# Patient Record
Sex: MALE | Race: WHITE
[De-identification: names, ages, dates, MRNs, and addresses within clinical notes are randomized per-mention and may not be internally consistent; named-entity substitution may affect disease eponyms.]

---

## 2018-08-27 ENCOUNTER — HOSPITAL ENCOUNTER (EMERGENCY)
Dept: HOSPITAL 76 - ED | Age: 53
LOS: 1 days | Discharge: HOME | End: 2018-08-28
Payer: COMMERCIAL

## 2018-08-27 DIAGNOSIS — R07.89: Primary | ICD-10-CM

## 2018-08-27 PROCEDURE — 83690 ASSAY OF LIPASE: CPT

## 2018-08-27 PROCEDURE — 36415 COLL VENOUS BLD VENIPUNCTURE: CPT

## 2018-08-27 PROCEDURE — 71045 X-RAY EXAM CHEST 1 VIEW: CPT

## 2018-08-27 PROCEDURE — 99283 EMERGENCY DEPT VISIT LOW MDM: CPT

## 2018-08-27 PROCEDURE — 80053 COMPREHEN METABOLIC PANEL: CPT

## 2018-08-27 PROCEDURE — 96361 HYDRATE IV INFUSION ADD-ON: CPT

## 2018-08-27 PROCEDURE — 85025 COMPLETE CBC W/AUTO DIFF WBC: CPT

## 2018-08-27 PROCEDURE — 96374 THER/PROPH/DIAG INJ IV PUSH: CPT

## 2018-08-27 PROCEDURE — 93005 ELECTROCARDIOGRAM TRACING: CPT

## 2018-08-27 PROCEDURE — 84484 ASSAY OF TROPONIN QUANT: CPT

## 2018-08-28 VITALS — DIASTOLIC BLOOD PRESSURE: 80 MMHG | SYSTOLIC BLOOD PRESSURE: 123 MMHG

## 2018-08-28 LAB
ALBUMIN DIAFP-MCNC: 4.3 G/DL (ref 3.2–5.5)
ALBUMIN/GLOB SERPL: 1.3 {RATIO} (ref 1–2.2)
ALP SERPL-CCNC: 57 IU/L (ref 42–121)
ALT SERPL W P-5'-P-CCNC: 27 IU/L (ref 10–60)
ANION GAP SERPL CALCULATED.4IONS-SCNC: 8 MMOL/L (ref 6–13)
AST SERPL W P-5'-P-CCNC: 27 IU/L (ref 10–42)
BASOPHILS NFR BLD AUTO: 0 10^3/UL (ref 0–0.1)
BASOPHILS NFR BLD AUTO: 0.1 %
BILIRUB BLD-MCNC: 1.3 MG/DL (ref 0.2–1)
BUN SERPL-MCNC: 16 MG/DL (ref 6–20)
CALCIUM UR-MCNC: 8.9 MG/DL (ref 8.5–10.3)
CHLORIDE SERPL-SCNC: 101 MMOL/L (ref 101–111)
CO2 SERPL-SCNC: 27 MMOL/L (ref 21–32)
CREAT SERPLBLD-SCNC: 1 MG/DL (ref 0.6–1.2)
EOSINOPHIL # BLD AUTO: 0 10^3/UL (ref 0–0.7)
EOSINOPHIL NFR BLD AUTO: 0.4 %
ERYTHROCYTE [DISTWIDTH] IN BLOOD BY AUTOMATED COUNT: 13.3 % (ref 12–15)
GFRSERPLBLD MDRD-ARVRAT: 79 ML/MIN/{1.73_M2} (ref 89–?)
GLOBULIN SER-MCNC: 3.3 G/DL (ref 2.1–4.2)
GLUCOSE SERPL-MCNC: 93 MG/DL (ref 70–100)
HGB UR QL STRIP: 14.2 G/DL (ref 14–18)
LIPASE SERPL-CCNC: 33 U/L (ref 22–51)
LYMPHOCYTES # SPEC AUTO: 0.7 10^3/UL (ref 1.5–3.5)
LYMPHOCYTES NFR BLD AUTO: 7.5 %
MCH RBC QN AUTO: 32.4 PG (ref 27–31)
MCHC RBC AUTO-ENTMCNC: 35.1 G/DL (ref 32–36)
MCV RBC AUTO: 92.2 FL (ref 80–94)
MONOCYTES # BLD AUTO: 0.6 10^3/UL (ref 0–1)
MONOCYTES NFR BLD AUTO: 6.5 %
NEUTROPHILS # BLD AUTO: 8.2 10^3/UL (ref 1.5–6.6)
NEUTROPHILS # SNV AUTO: 9.6 X10^3/UL (ref 4.8–10.8)
NEUTROPHILS NFR BLD AUTO: 85.5 %
PDW BLD AUTO: 9.5 FL (ref 7.4–11.4)
PLATELET # BLD: 183 10^3/UL (ref 130–450)
PROT SPEC-MCNC: 7.6 G/DL (ref 6.7–8.2)
RBC MAR: 4.4 10^6/UL (ref 4.7–6.1)
SODIUM SERPLBLD-SCNC: 136 MMOL/L (ref 135–145)

## 2018-08-28 NOTE — XRAY REPORT
Reason:  chest pain

Procedure Date:  08/28/2018   

Accession Number:  [id number] / [id number]                    

Procedure:  XR  - Chest 1 View X-Ray CPT Code:  68028

 

FULL RESULT:

 

 

EXAM:

CHEST RADIOGRAPHY

 

EXAM DATE: 8/28/2018 12:55 AM.

 

CLINICAL HISTORY: Chest pain.

 

COMPARISON: None.

 

TECHNIQUE: 1 view.

 

FINDINGS:

Lungs/Pleura: No alveolar consolidation or pleural effusion is seen. No 

pneumothorax is noted.

 

Mediastinum: Within exam limitations, the cardiomediastinal contour is 

normal.

 

Other: None.

 

IMPRESSION:

1. No acute abnormality seen in the chest.

 

[facility]

## 2018-08-28 NOTE — ED PHYSICIAN DOCUMENTATION
PD HPI CHEST PAIN





- Stated complaint


Stated Complaint: CHEST PRESSURE/HD PX/DIARRHEA





- Chief complaint


Chief Complaint: Cardiac





- History obtained from


History obtained from: Patient





- History of Present Illness


Timing - onset: How many hours ago (4)


Timing - onset during: Rest


Timing - details: Gradual onset, Still present


Quality: Pressure, Sharp


Location: Substernal


Associated symptoms: Diaphoresis, Nausea, General Weakness.  No: Shortness of 

air, Vomiting


Similar symptoms before: Has not had sx before





- Additional information


Additional information: 





Patient is a 53 year old male with no significant past medical history who is 

presenting to the emergency department for chest pain.  patient states that 

about 4 hours ago he had an episode of substernal and epigastric pain.  patient 

states that he has some nausea and diarrhea with it.  Patient states that he 

developed a headache and overall did not feel well so he came to the emergency 

department for evaluation.  





Review of Systems


Constitutional: denies: Fever, Chills


Eyes: reports: Photophobia


Cardiac: reports: Chest pain / pressure.  denies: Palpitations, Pedal edema, 

Calf pain


Respiratory: denies: Dyspnea, Cough


GI: reports: Nausea, Diarrhea.  denies: Vomiting


Musculoskeletal: denies: Back pain


Immunocompromised: denies: Immunocompromised





PD PAST MEDICAL HISTORY





- Past Medical History


Past Medical History: No





- Allergies


Allergies/Adverse Reactions: 


 Allergies











Allergy/AdvReac Type Severity Reaction Status Date / Time


 


No Known Drug Allergies Allergy   Verified 08/28/18 00:06














- Social History


Does the pt smoke?: No


Smoking Status: Never smoker





PD ED PE NORMAL





- Vitals


Vital signs reviewed: Yes





- General


General: Alert and oriented X 3





- HEENT


HEENT: Atraumatic





- Cardiac


Cardiac: RRR, No murmur





- Respiratory


Respiratory: No respiratory distress





- Abdomen


Abdomen: Soft





- Derm


Derm: Normal color, Warm and dry





- Extremities


Extremities: No deformity





- Neuro


Neuro: Alert and oriented X 3, CNs 2-12 intact, No motor deficit, Normal speech





- Psych


Psych: Normal mood





PD ED PE EXPANDED





- Abdomen


Abdomen: Tender to palpation, RUQ, Epigastric, LUQ.  No: Rebound, Guarding





Results





- Vitals


Vitals: 


 Vital Signs - 24 hr











  08/28/18 08/28/18





  00:03 00:41


 


Temperature 36.8 C 


 


Heart Rate 95 90


 


Respiratory 18 16





Rate  


 


Blood Pressure 135/90 H 121/82 H


 


O2 Saturation 95 98








 Oxygen











O2 Source                      Room air

















- EKG (time done)


  ** 0007


Rate: Rate (enter#) (93)


Rhythm: NSR


Axis: Normal


QRS: Normal


Ischemia: Normal ST segments





  ** 0151


Rhythm: NSR


Axis: Normal


Intervals: Normal MA


QRS: Normal


Ischemia: Normal ST segments


Compare to prior EKG: Unchanged from prior EKG





  ** 0352


Rate: Rate (enter#) (87)


Rhythm: NSR


Axis: Normal


Intervals: Normal MA


QRS: Normal


Ischemia: Normal ST segments





- Labs


Labs: 


 Laboratory Tests











  08/28/18 08/28/18 08/28/18





  00:20 00:20 00:20


 


WBC  9.6  


 


RBC  4.40 L  


 


Hgb  14.2  


 


Hct  40.5 L  


 


MCV  92.2  


 


MCH  32.4 H  


 


MCHC  35.1  


 


RDW  13.3  


 


Plt Count  183  


 


MPV  9.5  


 


Neut # (Auto)  8.2 H  


 


Lymph # (Auto)  0.7 L  


 


Mono # (Auto)  0.6  


 


Eos # (Auto)  0.0  


 


Baso # (Auto)  0.0  


 


Absolute Nucleated RBC  0.01  


 


Nucleated RBC %  0.1  


 


Sodium   136 


 


Potassium   3.9 


 


Chloride   101 


 


Carbon Dioxide   27 


 


Anion Gap   8.0 


 


BUN   16 


 


Creatinine   1.0 


 


Estimated GFR (MDRD)   79 L 


 


Glucose   93 


 


Calcium   8.9 


 


Total Bilirubin   1.3 H 


 


AST   27 


 


ALT   27 


 


Alkaline Phosphatase   57 


 


Troponin I    < 0.04


 


Total Protein   7.6 


 


Albumin   4.3 


 


Globulin   3.3 


 


Albumin/Globulin Ratio   1.3 


 


Lipase   33 














  08/28/18 08/28/18





  01:49 04:05


 


WBC  


 


RBC  


 


Hgb  


 


Hct  


 


MCV  


 


MCH  


 


MCHC  


 


RDW  


 


Plt Count  


 


MPV  


 


Neut # (Auto)  


 


Lymph # (Auto)  


 


Mono # (Auto)  


 


Eos # (Auto)  


 


Baso # (Auto)  


 


Absolute Nucleated RBC  


 


Nucleated RBC %  


 


Sodium  


 


Potassium  


 


Chloride  


 


Carbon Dioxide  


 


Anion Gap  


 


BUN  


 


Creatinine  


 


Estimated GFR (MDRD)  


 


Glucose  


 


Calcium  


 


Total Bilirubin  


 


AST  


 


ALT  


 


Alkaline Phosphatase  


 


Troponin I  0.28  < 0.04


 


Total Protein  


 


Albumin  


 


Globulin  


 


Albumin/Globulin Ratio  


 


Lipase  














- Rads (name of study)


  ** chest x-ray


Radiology: Final report received (normal)





PD MEDICAL DECISION MAKING





- ED course


Complexity details: reviewed old records, reviewed results, re-evaluated patient

, considered differential, d/w patient


ED course: 





Patient was seen and examined at bedside.  iv access was gained and labs were 

drawn.  patient was treated with a fluid bolus and morphine for pain.  ekg was 

within normal limits.  chest x-ray was performed and was negative.  patient's 

original diagnostics were all within normal limits.  Patient had repeat ekg and 

troponin.  while the ekg remained unchanged the tropon was slightly elevated.  

it was still below nstemi criteria but due to the increase a third set was 

ordered. Patient's third ekg was unchanged and the troponin remained negative.  

patient remained pain free and was stable for discharge with outpatient follow 

up.   





- Sepsis Event


Vital Signs: 


 Vital Signs - 24 hr











  08/28/18 08/28/18





  00:03 00:41


 


Temperature 36.8 C 


 


Heart Rate 95 90


 


Respiratory 18 16





Rate  


 


Blood Pressure 135/90 H 121/82 H


 


O2 Saturation 95 98








 Oxygen











O2 Source                      Room air

















Departure





- Departure


Disposition: 01 Home, Self Care


Clinical Impression: 


 Atypical chest pain





Condition: Good


Instructions:  ED Chest Pain Atypical Unkn Cause


Follow-Up: 


RAMYA POWER MD [Primary Care Provider] - 


Comments: 


Your diagnostics today were within normal limits.  Your pain is unlikely 

cardiac in nature.  that being said it is only a snap shot in time.  You should 

follow up with your doctor for an echocardiogram and a stress test to fully 

rule out any cardiac dysfunction.  You should return to the emergency 

department at any time for new, worsening or uncontrollable symptoms.

## 2018-09-18 ENCOUNTER — HOSPITAL ENCOUNTER (OUTPATIENT)
Dept: HOSPITAL 76 - DI | Age: 53
Discharge: HOME | End: 2018-09-18
Attending: GENERAL PRACTICE
Payer: COMMERCIAL

## 2018-09-18 DIAGNOSIS — M67.462: ICD-10-CM

## 2018-09-18 DIAGNOSIS — M23.92: Primary | ICD-10-CM

## 2018-09-18 DIAGNOSIS — M70.42: ICD-10-CM

## 2018-09-18 DIAGNOSIS — M70.52: ICD-10-CM

## 2018-09-19 NOTE — MRI REPORT
Reason:  PAIN IN LEFT KNEE

Procedure Date:  09/18/2018   

Accession Number:  [id number] / [id number]                    

Procedure:  MRI - Knee LT W/O CPT Code:  

 

FULL RESULT:

 

 

EXAM:

LEFT KNEE MRI WITHOUT CONTRAST

 

EXAM DATE: 9/18/2018 04:29 PM.

 

CLINICAL HISTORY: Left knee pain.

 

COMPARISON: None.

 

TECHNIQUE: Multiplanar, multisequence T1-weighted and fluid-sensitive 

sequences of the knee without contrast. Other: None.

 

FINDINGS:

Bones and Articular Cartilage: Small focal partial-thickness articular 

cartilage fissure at the lateral patellar facet. No acute fracture or 

bone lesions.

 

Medial Meniscus: The medial meniscus is intact.

 

Lateral Meniscus: The lateral meniscus is intact.

 

Cruciate Ligaments: The anterior and posterior cruciate ligaments are 

intact.

 

Collateral Ligaments: The medial collateral and lateral collateral 

ligamentous structures are intact.

 

Tendons: The quadriceps, patellar, semimembranosus, and popliteus tendons 

are unremarkable.

 

Musculature: No edema or fatty atrophy.

 

Other: No effusion. Tiny Baker's cyst. There is an approximately 3 x 2 x 

1.3 cm multiseptated ganglion adjacent to the femoral origin site of the 

medial head gastrocnemius tendon. No loose bodies. The medial and lateral 

retinacula are intact. Small amount of fluid within the prepatellar and 

superficial infrapatellar bursae.

IMPRESSION:

1. Small focal partial-thickness articular cartilage fissure at the 

lateral patellar facet.

2. No ligament or meniscal injury.

3. A 3 x 2 x 1.3 cm multiseptated ganglion adjacent to the femoral origin 

site of the medial head gastrocnemius tendon.

4. Mild prepatellar and superficial infrapatellar bursitis.

 

[facility] MUSCULOSKELETAL RADIOLOGY SECTION

## 2019-10-09 ENCOUNTER — HOSPITAL ENCOUNTER (OUTPATIENT)
Dept: HOSPITAL 76 - DI | Age: 54
Discharge: HOME | End: 2019-10-09
Attending: GENERAL PRACTICE
Payer: COMMERCIAL

## 2019-10-09 DIAGNOSIS — M70.42: Primary | ICD-10-CM

## 2019-10-09 NOTE — MRI REPORT
Reason:  PAIN IN LEFT KNEE

Procedure Date:  10/09/2019   

Accession Number:  [id number] / [id number]                    

Procedure:  MRI - Knee LT W/O CPT Code:  

 

FULL RESULT:

 

 

EXAM:

LEFT KNEE MRI WITHOUT CONTRAST

 

EXAM DATE: 10/9/2019 10:55 AM.

 

CLINICAL HISTORY: Left knee pain.

 

COMPARISON: KNEE LT MRI 09/18/2018 3:59 PM.

 

TECHNIQUE: Multiplanar, multisequence T1-weighted and fluid-sensitive 

sequences of the knee without contrast. Other: None.

 

FINDINGS:

Bones and articular cartilage: No acute fracture or bone lesion. No 

marrow edema. Articular cartilage fissures at the lateral patellar facet. 

No patellar subluxation.

 

Medial Meniscus: The medial meniscus is intact.

 

Lateral Meniscus: The lateral meniscus is intact.

 

Cruciate Ligaments: The anterior and posterior cruciate ligaments are 

intact.

 

Collateral Ligaments: The medial collateral and lateral collateral 

ligamentous structures are intact.

 

Tendons: The quadriceps, patellar, semimembranosus, and popliteus tendons 

are unremarkable.

 

Musculature: No edema or fatty atrophy.

 

Other: No effusion. Very small Baker's cyst. There is an approximately 

3.1 x 1.2 x 1.7 cm multiseptated ganglion or synovial cyst adjacent to 

the distal femoral origin site of the medial head gastrocnemius tendon 

which is unchanged since the previous study. No loose bodies.  The medial 

and lateral retinacula are intact. The previously seen small amount of 

fluid within the prepatellar and superficial infrapatellar bursae has 

decreased slightly since the previous study.

IMPRESSION:

1. Articular cartilage fissures at the lateral patellar facet. No change.

2. No ligament or meniscal tear.

3. Multiseptated ganglion or synovial cyst adjacent to the distal femoral 

origin site of the medial head gastrocnemius tendon. No change.

4. Minimal prepatellar and superficial infrapatellar bursitis which has 

decreased since the previous study.

 

[facility]

## 2019-10-10 ENCOUNTER — HOSPITAL ENCOUNTER (EMERGENCY)
Dept: HOSPITAL 76 - ED | Age: 54
Discharge: HOME | End: 2019-10-10
Payer: COMMERCIAL

## 2019-10-10 VITALS — SYSTOLIC BLOOD PRESSURE: 130 MMHG | DIASTOLIC BLOOD PRESSURE: 78 MMHG

## 2019-10-10 DIAGNOSIS — H81.12: Primary | ICD-10-CM

## 2019-10-10 DIAGNOSIS — J01.10: ICD-10-CM

## 2019-10-10 PROCEDURE — 99283 EMERGENCY DEPT VISIT LOW MDM: CPT

## 2019-10-10 PROCEDURE — 70450 CT HEAD/BRAIN W/O DYE: CPT

## 2019-10-10 PROCEDURE — 99284 EMERGENCY DEPT VISIT MOD MDM: CPT

## 2019-10-10 NOTE — CT REPORT
Reason:  dizziness and headache for a week

Procedure Date:  10/10/2019   

Accession Number:  [id number] / [id number]                    

Procedure:  CT  - HEAD WO CPT Code:  

 

FULL RESULT:

 

 

EXAM:

CT HEAD

 

EXAM DATE: 10/10/2019 12:09 PM.

 

CLINICAL HISTORY: Dizziness and headache for a week.

 

COMPARISON: None.

 

TECHNIQUE: Multiaxial CT images were obtained from the foramen magnum to 

the vertex. Reformats: Sagittal and coronal. IV contrast: None.

 

In accordance with CT protocol optimization, one or more of the following 

dose reduction techniques were utilized for this exam: automated exposure 

control, adjustment of mA and/or KV based on patient size, or use of 

iterative reconstructive technique.

 

FINDINGS:

Parenchyma: No intraparenchymal hemorrhage. No evidence of mass, midline 

shift, or CT findings of infarction. Gray-white differentiation is 

distinct.

 

Extraaxial Spaces: Normal for age. No subdural or epidural collections 

identified.

 

Ventricles: Normal in size and position.

 

Sinuses and Orbits: Imaged paranasal sinuses, orbits, and mastoids show 

no significant abnormality.

 

Bones: No evidence of fracture or calvarial defect.

IMPRESSION:

1. Negative noncontrast brain CT. No intracranial hemorrhage or 

space-occupying lesion.

 

[facility]

## 2019-10-10 NOTE — ED PHYSICIAN DOCUMENTATION
PD HPI URI





- Stated complaint


Stated Complaint: HA/DIZZY BODY ACHES





- Chief complaint


Chief Complaint: Neuro





- History obtained from


History obtained from: Patient





- History of Present Illness


Timing - onset: How many days ago (8)


Timing duration: Days (has had 8 days of intermittent positional vertigo when 

bending over and first getting up. He has been traveling for the week, coming 

back from deployment, with stops in [place] and [place]. Has felt pressure in left 

periorbital area and left ear. The past couple days is having some congestion, 

watery eyes and some sore throat as well.)


Timing details: Gradual onset, Intermittant


Associated symptoms: Nasal congestion, Sinus pain (left frontal and periorbital 

area).  No: Fever, Swollen nodes, Chest pain, Dyspnea


Similar symptoms before: Has not had sx before


Recently seen: Not recently seen





Review of Systems


Constitutional: denies: Fever, Chills, Myalgias


Eyes: reports: Discharge (clear/watery), Irritation


Ears: denies: Loss of hearing


Nose: reports: Sinus pressure / pain.  denies: Rhinorrhea / runny nose, 

Congestion


Throat: reports: Sore throat


Respiratory: denies: Cough


GI: denies: Nausea, Vomiting, Diarrhea


Skin: denies: Rash, Lesions


Neurologic: denies: Focal weakness, Numbness, Confused, Altered mental status





PD PAST MEDICAL HISTORY





- Past Medical History


Cardiovascular: High cholesterol


Psych: Depression, Anxiety


Other Past Medical History: seasonal allergies.





- Present Medications


Home Medications: 


                                Ambulatory Orders











 Medication  Instructions  Recorded  Confirmed


 


Cetirizine [ZyrTEC] 10 mg PO DAILY #15 tablet 10/10/19 


 


Doxycycline Hyclate 100 mg PO BID #14 capsule 10/10/19 


 


Meclizine HCl [Motion Sickness 25 mg PO Q6H PRN #25 tablet 10/10/19 





Relief]   


 


Rosuvastatin Calcium [Crestor]  10/10/19 


 


dexAMETHasone [Decadron] 4 mg PO DAILY #5 tablet 10/10/19 














- Allergies


Allergies/Adverse Reactions: 


                                    Allergies











Allergy/AdvReac Type Severity Reaction Status Date / Time


 


No Known Drug Allergies Allergy   Verified 08/28/18 00:06














- Social History


Does the pt smoke?: No


Smoking Status: Never smoker


Does the pt drink ETOH?: No


Does the pt have substance abuse?: No





PD ED PE NORMAL





- Vitals


Vital signs reviewed: Yes





- General


General: Alert and oriented X 3, No acute distress, Well developed/nourished





- HEENT


HEENT: Pharynx benign.  No: Ears normal (right is okay. Left TM with some 

pressure/fluid appearance behind it. )





- Neck


Neck: Supple, no meningeal sign, No adenopathy





- Cardiac


Cardiac: RRR, No murmur





- Respiratory


Respiratory: Clear bilaterally





- Derm


Derm: Normal color, Warm and dry





- Neuro


Neuro: Alert and oriented X 3, CNs 2-12 intact, No motor deficit, No sensory 

deficit, Normal speech, Other


Eye Opening: Spontaneous


Motor: Obeys Commands


Verbal: Oriented


GCS Score: 15





Results





- Vitals


Vitals: 


                               Vital Signs - 24 hr











  10/10/19





  10:26


 


Temperature 36.5 C


 


Heart Rate 77


 


Respiratory 18





Rate 


 


Blood Pressure 130/78


 


O2 Saturation 96








                                     Oxygen











O2 Source                      Room air

















- Rads (name of study)


  ** head CT


Radiology: Prelim report reviewed, See rad report (normal)





PD MEDICAL DECISION MAKING





- ED course


Complexity details: considered differential (sounds likely some URI and fluid 

congestion, with inner ear vertigo. However has frontal headache and feeling of 

pressure, so can get CT to ensure no mass effect), d/w patient





Departure





- Departure


Disposition: 01 Home, Self Care


Clinical Impression: 


 Positional vertigo of left ear





Acute sinusitis


Qualifiers:


 Sinusitis location: frontal Recurrence: non-recurrent Qualified Code(s): J01.10

 - Acute frontal sinusitis, unspecified





Condition: Stable


Record reviewed to determine appropriate education?: Yes


Instructions:  ED Sinusitis Abx Tx, ED Vertigo Unspecified


Follow-Up: 


PABLO ANDRADE III, MD [Primary Care Provider] - 


Prescriptions: 


Cetirizine [ZyrTEC] 10 mg PO DAILY #15 tablet


dexAMETHasone [Decadron] 4 mg PO DAILY #5 tablet


Doxycycline Hyclate 100 mg PO BID #14 capsule


Meclizine HCl [Motion Sickness Relief] 25 mg PO Q6H PRN #25 tablet


 PRN Reason: Vertigo


Comments: 


Stay well-hydrated.  Your CT scan appears normal without any signs of 

intracranial problem.





This sounds likely to be a sinus inflammation and some inflammation of the inner

ear which is where the balance center is.  We will treat this with anti-

inflammatories and antihistamines.  There may be an infection to it so 

antibiotics as well.





Slow for change of position to minimize dizziness.  Meclizine every 6 hours if 

needed for dizziness.





Follow-up with your primary care in the next few days for recheck, call for an 

appointment.  Light activity for the next couple of days.


Discharge Date/Time: 10/10/19 12:38